# Patient Record
Sex: FEMALE | Race: WHITE | Employment: FULL TIME | URBAN - METROPOLITAN AREA
[De-identification: names, ages, dates, MRNs, and addresses within clinical notes are randomized per-mention and may not be internally consistent; named-entity substitution may affect disease eponyms.]

---

## 2017-07-17 ENCOUNTER — OFFICE VISIT (OUTPATIENT)
Dept: DERMATOLOGY | Age: 32
End: 2017-07-17

## 2017-07-17 DIAGNOSIS — Z87.2 HX OF ATOPIC DERMATITIS: ICD-10-CM

## 2017-07-17 DIAGNOSIS — L81.4 LENTIGO: ICD-10-CM

## 2017-07-17 DIAGNOSIS — D22.9 MULTIPLE NEVI: Primary | ICD-10-CM

## 2017-07-17 PROCEDURE — 99213 OFFICE O/P EST LOW 20 MIN: CPT | Performed by: DERMATOLOGY

## 2017-09-13 ENCOUNTER — TELEPHONE (OUTPATIENT)
Dept: DERMATOLOGY | Age: 32
End: 2017-09-13

## 2019-03-15 LAB
ABO, EXTERNAL RESULT: NORMAL
C. TRACHOMATIS, EXTERNAL RESULT: NEGATIVE
N. GONORRHOEAE, EXTERNAL RESULT: NEGATIVE
RH FACTOR, EXTERNAL RESULT: POSITIVE
RPR, EXTERNAL RESULT: NORMAL
RUBELLA TITER, EXTERNAL RESULT: NORMAL

## 2019-08-21 LAB — GBS, EXTERNAL RESULT: NEGATIVE

## 2019-09-13 ENCOUNTER — HOSPITAL ENCOUNTER (INPATIENT)
Age: 34
LOS: 2 days | Discharge: HOME | End: 2019-09-15
Attending: OBSTETRICS & GYNECOLOGY | Admitting: OBSTETRICS & GYNECOLOGY
Payer: COMMERCIAL

## 2019-09-13 PROBLEM — Z86.59 HISTORY OF POSTPARTUM DEPRESSION, CURRENTLY PREGNANT IN THIRD TRIMESTER: Status: ACTIVE | Noted: 2019-09-13

## 2019-09-13 PROBLEM — E06.3 HASHIMOTO'S THYROIDITIS: Status: ACTIVE | Noted: 2019-09-13

## 2019-09-13 PROBLEM — O99.891 HISTORY OF POSTPARTUM DEPRESSION, CURRENTLY PREGNANT IN THIRD TRIMESTER: Status: ACTIVE | Noted: 2019-09-13

## 2019-09-13 PROBLEM — Z34.03 SUPERVISION OF LOW-RISK FIRST PREGNANCY, THIRD TRIMESTER: Status: ACTIVE | Noted: 2019-09-13

## 2019-09-13 PROBLEM — Z34.83 ENCOUNTER FOR SUPERVISION OF OTHER NORMAL PREGNANCY, THIRD TRIMESTER: Status: ACTIVE | Noted: 2019-09-13

## 2019-09-13 LAB
AMPHETAMINE SCREEN, URINE: NORMAL
BARBITURATE SCREEN URINE: NORMAL
BENZODIAZEPINE SCREEN, URINE: NORMAL
BUPRENORPHINE URINE: NORMAL
CANNABINOID SCREEN URINE: NORMAL
COCAINE METABOLITE SCREEN URINE: NORMAL
HCT VFR BLD CALC: 34.7 % (ref 36–48)
HEMOGLOBIN: 11.6 G/DL (ref 12–16)
Lab: NORMAL
MCH RBC QN AUTO: 30.5 PG (ref 26–34)
MCHC RBC AUTO-ENTMCNC: 33.5 G/DL (ref 31–36)
MCV RBC AUTO: 91 FL (ref 80–100)
METHADONE SCREEN, URINE: NORMAL
OPIATE SCREEN URINE: NORMAL
OXYCODONE URINE: NORMAL
PDW BLD-RTO: 14.1 % (ref 12.4–15.4)
PH UA: 6
PHENCYCLIDINE SCREEN URINE: NORMAL
PLATELET # BLD: 179 K/UL (ref 135–450)
PMV BLD AUTO: 8.9 FL (ref 5–10.5)
PROPOXYPHENE SCREEN: NORMAL
RBC # BLD: 3.82 M/UL (ref 4–5.2)
SPECIMEN STATUS: NORMAL
WBC # BLD: 9.7 K/UL (ref 4–11)

## 2019-09-13 PROCEDURE — 0HQ9XZZ REPAIR PERINEUM SKIN, EXTERNAL APPROACH: ICD-10-PCS | Performed by: OBSTETRICS & GYNECOLOGY

## 2019-09-13 PROCEDURE — 6360000002 HC RX W HCPCS: Performed by: NURSE PRACTITIONER

## 2019-09-13 PROCEDURE — 7200000001 HC VAGINAL DELIVERY

## 2019-09-13 PROCEDURE — 36415 COLL VENOUS BLD VENIPUNCTURE: CPT

## 2019-09-13 PROCEDURE — 1220000000 HC SEMI PRIVATE OB R&B

## 2019-09-13 PROCEDURE — 6370000000 HC RX 637 (ALT 250 FOR IP)

## 2019-09-13 PROCEDURE — 80307 DRUG TEST PRSMV CHEM ANLYZR: CPT

## 2019-09-13 PROCEDURE — 85027 COMPLETE CBC AUTOMATED: CPT

## 2019-09-13 PROCEDURE — 86780 TREPONEMA PALLIDUM: CPT

## 2019-09-13 PROCEDURE — 10907ZC DRAINAGE OF AMNIOTIC FLUID, THERAPEUTIC FROM PRODUCTS OF CONCEPTION, VIA NATURAL OR ARTIFICIAL OPENING: ICD-10-PCS | Performed by: OBSTETRICS & GYNECOLOGY

## 2019-09-13 RX ORDER — LIDOCAINE HYDROCHLORIDE 10 MG/ML
INJECTION, SOLUTION EPIDURAL; INFILTRATION; INTRACAUDAL; PERINEURAL
Status: DISCONTINUED
Start: 2019-09-13 | End: 2019-09-13

## 2019-09-13 RX ORDER — SODIUM CHLORIDE 0.9 % (FLUSH) 0.9 %
10 SYRINGE (ML) INJECTION EVERY 12 HOURS SCHEDULED
Status: DISCONTINUED | OUTPATIENT
Start: 2019-09-13 | End: 2019-09-15 | Stop reason: HOSPADM

## 2019-09-13 RX ORDER — IBUPROFEN 800 MG/1
800 TABLET ORAL EVERY 8 HOURS
Status: DISCONTINUED | OUTPATIENT
Start: 2019-09-14 | End: 2019-09-15 | Stop reason: HOSPADM

## 2019-09-13 RX ORDER — LANOLIN 100 %
OINTMENT (GRAM) TOPICAL PRN
Status: DISCONTINUED | OUTPATIENT
Start: 2019-09-13 | End: 2019-09-15 | Stop reason: HOSPADM

## 2019-09-13 RX ORDER — SODIUM CHLORIDE 0.9 % (FLUSH) 0.9 %
10 SYRINGE (ML) INJECTION PRN
Status: DISCONTINUED | OUTPATIENT
Start: 2019-09-13 | End: 2019-09-15 | Stop reason: HOSPADM

## 2019-09-13 RX ORDER — LEVOTHYROXINE SODIUM 0.07 MG/1
75 TABLET ORAL DAILY
Status: DISCONTINUED | OUTPATIENT
Start: 2019-09-14 | End: 2019-09-15 | Stop reason: HOSPADM

## 2019-09-13 RX ORDER — SODIUM CHLORIDE, SODIUM LACTATE, POTASSIUM CHLORIDE, CALCIUM CHLORIDE 600; 310; 30; 20 MG/100ML; MG/100ML; MG/100ML; MG/100ML
INJECTION, SOLUTION INTRAVENOUS CONTINUOUS
Status: DISCONTINUED | OUTPATIENT
Start: 2019-09-13 | End: 2019-09-15 | Stop reason: HOSPADM

## 2019-09-13 RX ORDER — DOCUSATE SODIUM 100 MG/1
100 CAPSULE, LIQUID FILLED ORAL 2 TIMES DAILY
Status: DISCONTINUED | OUTPATIENT
Start: 2019-09-13 | End: 2019-09-13

## 2019-09-13 RX ORDER — ACETAMINOPHEN 325 MG/1
650 TABLET ORAL EVERY 4 HOURS PRN
Status: DISCONTINUED | OUTPATIENT
Start: 2019-09-13 | End: 2019-09-15 | Stop reason: HOSPADM

## 2019-09-13 RX ORDER — SODIUM CHLORIDE 0.9 % (FLUSH) 0.9 %
10 SYRINGE (ML) INJECTION EVERY 12 HOURS SCHEDULED
Status: DISCONTINUED | OUTPATIENT
Start: 2019-09-13 | End: 2019-09-13

## 2019-09-13 RX ORDER — SODIUM CHLORIDE, SODIUM LACTATE, POTASSIUM CHLORIDE, CALCIUM CHLORIDE 600; 310; 30; 20 MG/100ML; MG/100ML; MG/100ML; MG/100ML
INJECTION, SOLUTION INTRAVENOUS CONTINUOUS
Status: DISCONTINUED | OUTPATIENT
Start: 2019-09-13 | End: 2019-09-13

## 2019-09-13 RX ORDER — LEVOTHYROXINE SODIUM 0.07 MG/1
75 TABLET ORAL DAILY
COMMUNITY

## 2019-09-13 RX ORDER — SODIUM CHLORIDE 0.9 % (FLUSH) 0.9 %
10 SYRINGE (ML) INJECTION PRN
Status: DISCONTINUED | OUTPATIENT
Start: 2019-09-13 | End: 2019-09-13

## 2019-09-13 RX ORDER — DOCUSATE SODIUM 100 MG/1
100 CAPSULE, LIQUID FILLED ORAL 2 TIMES DAILY
Status: DISCONTINUED | OUTPATIENT
Start: 2019-09-13 | End: 2019-09-15 | Stop reason: HOSPADM

## 2019-09-13 RX ORDER — ONDANSETRON 2 MG/ML
4 INJECTION INTRAMUSCULAR; INTRAVENOUS EVERY 6 HOURS PRN
Status: DISCONTINUED | OUTPATIENT
Start: 2019-09-13 | End: 2019-09-13

## 2019-09-13 RX ADMIN — BENZOCAINE AND LEVOMENTHOL: 200; 5 SPRAY TOPICAL at 21:39

## 2019-09-13 RX ADMIN — Medication 1 MILLI-UNITS/MIN: at 19:32

## 2019-09-13 NOTE — L&D DELIVERY NOTE
Mother's Information    Labor Events     labor?:  No  Rupture type:  Artificial=AROM  Fluid color:  Clear, Bloody Show  Fluid odor:  None     Mother Delivery Information    Episiotomy:  None  Lacerations:  1st  Surgical or Additional Est. Blood Loss (mL):  0 (View Only):  Edit in Flowsheets   Combined Est. Blood Loss (mL):  0        Saran Baby Alexandro Berry [[id number]]    Labor Events     labor?:  No   steroids?:  None  Cervical ripening date/time:     Antibiotics received during labor?:  No  Rupture Identifier:  Sac 1   Rupture date/time: 19 18:44:00   Rupture type:  Artificial=AROM  Fluid color:  Clear, Bloody Show  Fluid odor:  None  Induction:  None  Augmentation:  None  Labor complications:  None          Labor Event Times    Labor onset date/time: 19   Dilation complete date/time:   19 EDT   Start pushin2019   Decision time (emergent ):        Anesthesia    Method:  None     Assisted Delivery Details    Forceps attempted?:  No  Vacuum extractor attempted?:  No     Document Additional Attempt       Document Additional Attempt             Shoulder Dystocia    Shoulder dystocia present?:  No  Add Second Maneuver  Add Third Maneuver  Add Fourth Maneuver  Add Fifth Maneuver  Add Sixth Maneuver  Add Seventh Maneuver  Add Eighth Maneuver  Add Ninth Maneuver      Presentation    Presentation:  Vertex  _:  Occiput  _:  Anterior     [place] Information    Head delivery date/time:  2019 19:07:00   Changing the 's delivery date/time could affect patient care.:     Delivery date/time:   19   Delivery type:  Vaginal, Spontaneous    Details:         Delivery Providers    Delivering clinician:  TAL Bui - TRAN   Provider Role    Sara Jiang RN Registered Nurse    Abdulaziz Palma, RN Registered Nurse    Timothy Vaughan RN OB Tech      Cord    Vessels:  3 Vessels  Complications:  None  Delayed cord clamping?:  Yes  Cord clamped date/time:  2019 190  Cord blood disposition:  Lab     Placenta    Date/time:  2019 19:21:00  Removal:  Expressed  Appearance:  Intact  Disposition:  Refrigerator     Delivery Resuscitation    Method:  Bulb Suction, Stimulation     Apgars    Living status:  Living  Apgars   1 Minute:   5 Minute:   10 Minute 15 Minute 20 Minute   Skin Color: 1  1       Heart Rate: 2  2       Reflex Irritability: 2  2       Muscle Tone: 2  2       Respiratory Effort: 1  2       Total: 8  9               Apgars Assigned By:  Veronika MENDES KTXAFW,CW\[id number]\     Skin to Skin    Skin to skin initiation date/time: 19 19:08:00   Skin to skin with: Mother, Father  Skin to skin end date/time:         Measurements    ID band #:  [id number]       Delivery Information    Episiotomy:  None  Perineal lacerations:  1st Repaired:  Yes   Vaginal laceration:  No    Cervical laceration:  No    Surgical or additional est. blood loss (mL):  0 (View Only):  Edit in Flowsheets   Combined est. blood loss (mL):  0     Vaginal Delivery Counts    Initial count personnel:  ORLY MEIER RN  Initial count verified by:  SARAH GUTIERREZ CNM   4x4:   Needles:   Instruments:   Lap Pads:   Sponges:     Initial counts:          Final counts:                Called to room by RN, patient feeling more intense pressure. AROM scant, clear fluid. SVE anterior lip. Patient feeling urge to push. Pushing well with contractions.  term male over 1st degree perineal laceration. Infant to mother's abdomen. Delayed cord clamping performed. Infant skin-to-skin with mother. Gentle tension on cord, cord avulsed. Placenta then delivered spontaneously. Vaginal sweep performed. 1st degree laceration repaired with 3-0 vicryl and good hemostasis. Infant and mother stable in recovery. Dr. Jose Willams notified of delivery.

## 2019-09-14 LAB — TOTAL SYPHILLIS IGG/IGM: NORMAL

## 2019-09-14 PROCEDURE — 1220000000 HC SEMI PRIVATE OB R&B

## 2019-09-14 PROCEDURE — 6370000000 HC RX 637 (ALT 250 FOR IP): Performed by: NURSE PRACTITIONER

## 2019-09-14 RX ADMIN — IBUPROFEN 800 MG: 800 TABLET, FILM COATED ORAL at 08:49

## 2019-09-14 RX ADMIN — LEVOTHYROXINE SODIUM 75 MCG: 75 TABLET ORAL at 08:18

## 2019-09-14 RX ADMIN — IBUPROFEN 800 MG: 800 TABLET, FILM COATED ORAL at 00:35

## 2019-09-14 RX ADMIN — IBUPROFEN 800 MG: 800 TABLET, FILM COATED ORAL at 16:57

## 2019-09-14 ASSESSMENT — PAIN SCALES - GENERAL
PAINLEVEL_OUTOF10: 3
PAINLEVEL_OUTOF10: 4
PAINLEVEL_OUTOF10: 6

## 2019-09-15 VITALS
WEIGHT: 160 LBS | SYSTOLIC BLOOD PRESSURE: 103 MMHG | RESPIRATION RATE: 16 BRPM | HEART RATE: 91 BPM | TEMPERATURE: 97.9 F | DIASTOLIC BLOOD PRESSURE: 64 MMHG | BODY MASS INDEX: 27.31 KG/M2 | HEIGHT: 64 IN

## 2019-09-15 PROBLEM — Z34.83 ENCOUNTER FOR SUPERVISION OF OTHER NORMAL PREGNANCY, THIRD TRIMESTER: Status: RESOLVED | Noted: 2019-09-13 | Resolved: 2019-09-15

## 2019-09-15 PROBLEM — Z86.59 HISTORY OF POSTPARTUM DEPRESSION, CURRENTLY PREGNANT IN THIRD TRIMESTER: Status: RESOLVED | Noted: 2019-09-13 | Resolved: 2019-09-15

## 2019-09-15 PROBLEM — E06.3 HASHIMOTO'S THYROIDITIS: Status: RESOLVED | Noted: 2019-09-13 | Resolved: 2019-09-15

## 2019-09-15 PROBLEM — Z34.03 SUPERVISION OF LOW-RISK FIRST PREGNANCY, THIRD TRIMESTER: Status: RESOLVED | Noted: 2019-09-13 | Resolved: 2019-09-15

## 2019-09-15 PROBLEM — O99.891 HISTORY OF POSTPARTUM DEPRESSION, CURRENTLY PREGNANT IN THIRD TRIMESTER: Status: RESOLVED | Noted: 2019-09-13 | Resolved: 2019-09-15

## 2019-09-15 PROCEDURE — 6370000000 HC RX 637 (ALT 250 FOR IP): Performed by: NURSE PRACTITIONER

## 2019-09-15 RX ADMIN — LEVOTHYROXINE SODIUM 75 MCG: 75 TABLET ORAL at 09:00

## 2019-09-15 RX ADMIN — IBUPROFEN 800 MG: 800 TABLET, FILM COATED ORAL at 00:41

## 2019-09-15 RX ADMIN — IBUPROFEN 800 MG: 800 TABLET, FILM COATED ORAL at 14:00

## 2019-09-15 RX ADMIN — DOCUSATE SODIUM 100 MG: 100 CAPSULE, LIQUID FILLED ORAL at 00:42

## 2019-09-15 RX ADMIN — DOCUSATE SODIUM 100 MG: 100 CAPSULE, LIQUID FILLED ORAL at 10:47

## 2019-09-15 ASSESSMENT — PAIN SCALES - GENERAL
PAINLEVEL_OUTOF10: 1
PAINLEVEL_OUTOF10: 1

## 2019-09-15 NOTE — FLOWSHEET NOTE
Patient sitting up in bed with infant in arms appears to be sleeping comfortably. Discussed plan to take infant for testing, mob states ok.

## 2020-02-10 ENCOUNTER — TELEPHONE (OUTPATIENT)
Dept: DERMATOLOGY | Age: 35
End: 2020-02-10

## 2020-02-12 NOTE — TELEPHONE ENCOUNTER
LVM for patient-she can keep her appointment in 6/2020 if she does not have any current concerns. If patient does have concerns, we can move her appointment up.

## 2021-03-18 LAB
ABO, EXTERNAL RESULT: NORMAL
HEP B, EXTERNAL RESULT: NEGATIVE
HEPATITIS C ANTIBODY, EXTERNAL RESULT: NEGATIVE
RH FACTOR, EXTERNAL RESULT: NORMAL
RPR, EXTERNAL RESULT: NON REACTIVE
RUBELLA TITER, EXTERNAL RESULT: NORMAL

## 2021-05-10 ENCOUNTER — OFFICE VISIT (OUTPATIENT)
Dept: DERMATOLOGY | Age: 36
End: 2021-05-10
Payer: COMMERCIAL

## 2021-05-10 VITALS — TEMPERATURE: 98.1 F

## 2021-05-10 DIAGNOSIS — L70.0 ACNE VULGARIS: ICD-10-CM

## 2021-05-10 DIAGNOSIS — L82.0 INFLAMED SEBORRHEIC KERATOSIS: ICD-10-CM

## 2021-05-10 DIAGNOSIS — L73.0 ACNE SCAR: ICD-10-CM

## 2021-05-10 DIAGNOSIS — D22.9 MULTIPLE NEVI: Primary | ICD-10-CM

## 2021-05-10 DIAGNOSIS — L81.4 LENTIGO: ICD-10-CM

## 2021-05-10 PROCEDURE — 99204 OFFICE O/P NEW MOD 45 MIN: CPT | Performed by: DERMATOLOGY

## 2021-05-10 PROCEDURE — 17110 DESTRUCTION B9 LES UP TO 14: CPT | Performed by: DERMATOLOGY

## 2021-05-10 RX ORDER — CLINDAMYCIN PHOSPHATE 10 UG/ML
LOTION TOPICAL
Qty: 60 ML | Refills: 4 | Status: SHIPPED | OUTPATIENT
Start: 2021-05-10 | End: 2021-05-14 | Stop reason: SDUPTHER

## 2021-05-10 NOTE — PROGRESS NOTES
[facility]  Nydia Donis MD  [address] [place] [address],[facility]  1985    37 y.o. female     Date of Visit: 5/10/2021    Chief Complaint: moles, lesions  Chief Complaint   Patient presents with    Skin Exam     tbse     Last seen: 7-2017    History of Present Illness    Running dad's 19 [employer]. *Pregnant - due Oct 2021  Has 2 children - 3, 1.6 yo    1. Here for eval of asx moles on the trunk and extremities, present for several years and no changes noted in s/s/c of any lesions; no bleeding lesions. She has a lesion on the forehead that she would like checked. Present for several years and unchanged. NO personal or family hx of skin cancer except for grandfather with NMSC. I had previously noted a dark nevus on the left lateral/posterior thigh at her last visit in 2017it appears that she had this biopsied since she was last seen by another Dorothyann Aryan does not remember it specifically being done but has a subtle scar here with focal central recurrence of pigment. It was done at the dermatology group in [facility] had seen Dr. Cris Rosales and her PA who had removed a lipoma on her left leg as well. 2. Itchy dry lesion on the back - ISK - LN2    3. C/o worsening breakouts on the back, arms/shoulders over the past few years. 4.  Would like to consider further treatment for acne scarring on the cheeks. Had laser in the past with good results and no complications. Other hx: hx of \"eczema\" on the arms and legs (pruritic erythematous patches) - typically has been worse in the winter and clears in the summers with sun exposure. Has tried topical steroids with improvement but flares within a few days with stopping use. Had flared at last visit with bruising from scratching so hard and had trouble sleeping some nights. Was treated with IM Kenalog and improved (had transient dimple in the buttocks from injection site - resolved). No recent trx needed.     Had lipoma removed - Zitelli/RENETTA OSCAR thigh    Previously addressed: acne scarring - s/p QuadraLase trx [phone] with no complications. She had erythema and crusting for several days but healed within a week. She had improvement after the trx but feels that the effects have diminished as the months have passed. She also had Fraxel (~4 trx) on the cheeks in the past and light trx (\"gisell light or soft light\") on the chin/nose with significant improvement initially but less significant improvement with subsequent trx. Review of Systems:  Gen: Feels well, good sense of health. Skin: No changing moles or lesions. Past Medical History, Family History, Surgical History, Medications and Allergies reviewed. Past Medical History:   Diagnosis Date    Post partum depression     Thyroid disease     Hashimotos       Past Surgical History:   Procedure Laterality Date    WISDOM TOOTH EXTRACTION         Outpatient Medications Marked as Taking for the 5/10/21 encounter (Office Visit) with Jorge Yao MD   Medication Sig Dispense Refill    Probiotic Product (PROBIOTIC PO) Take by mouth      levothyroxine (SYNTHROID) 75 MCG tablet Take 75 mcg by mouth Daily      Prenatal Vit-Fe Fumarate-FA (PRENATAL VITAMIN PO) Take by mouth         Allergies   Allergen Reactions    Biaxin [Clarithromycin] Nausea Only         Physical Examination     Gen, well-appearing  Full skin exam done except for underwear covered areas. trunk and extremities with scattered brown macules and papules including a 4 mm dark brown uniform well-defined oval macule on the R lower abdomen- stable in appearance  L lateral cheek with 6-7 mm tan macule    Back with pinkish-tan dull dry stuck on papule  Back, shoulders with scattered erythematous papules and macules  Cheeks with subtle textural scarring    Assessment and Plan     1. Benign-appearing nevi and lentigo - cheek  1b.  Blue nevus - FH  - educ re si/sx/ABCD's of MM   educ sun protection - OTC sunscreen with SPF 30-50+ recommended and reviewed usage  encouraged skin check yearly (sooner if indicated), self checks  - consider bx if lentigo changing/enlarging  - laser if cosmetic removal desired    2. ISK - back - 1  - lesion(s) treated with liquid nitrogen x 2 cycles. Patient educated on risk of blister, hypopigmentation/scar and wound care. 3. Acne - truncal flares - worsening  - start clinda lotion daily - bid  - BP wash; ed irritaiton and bleaching    4.  Acne scarring - s/p quadraLase severla years ago  - Consider MN, CO2 laser - 300-400 and GL for lentigo on the L cheek    *new - last seen 2017

## 2021-05-10 NOTE — PATIENT INSTRUCTIONS
Elta MD UV Clear SPF 45  Cerave Acne foaming cleanser or Panoxyl wash - for back/chest      Protecting Yourself From the Sun    · Apply an over-the-counter broad spectrum water resistant sunscreen with an SPF of at least 30 to exposed areas of the skin. Dont forget the ears and lips! Remember to reapply sunscreen about every 2 hours and after swimming or sweating. · Wear sun protective clothing. Swim shirts (aka. rash guards) are a great idea and negates the need to reapply sunscreen in those areas.      · Seek the shade whenever possible especially between the hours of 10 am and 4 pm when the suns rays are the strongest.     · Avoid tanning beds  ·

## 2021-05-13 NOTE — TELEPHONE ENCOUNTER
Dr Akila Mckeon patient  Pt c/b #[phone]  Pt stated  Was prescribed clindamycin (CLEOCIN T) 1 % lotion   But insurance won't pay for it the cost for medication is $70. Pt wanted to know was there another medication dr Akila Mckeon can prescribe.   Please c/b to discuss

## 2021-05-14 RX ORDER — CLINDAMYCIN PHOSPHATE 10 UG/ML
LOTION TOPICAL
Qty: 60 ML | Refills: 4 | Status: ON HOLD | OUTPATIENT
Start: 2021-05-14 | End: 2021-09-06

## 2021-08-24 LAB — GBS, EXTERNAL RESULT: POSITIVE

## 2021-09-06 ENCOUNTER — HOSPITAL ENCOUNTER (OUTPATIENT)
Age: 36
Discharge: HOME | End: 2021-09-06
Attending: OBSTETRICS & GYNECOLOGY | Admitting: OBSTETRICS & GYNECOLOGY
Payer: COMMERCIAL

## 2021-09-06 VITALS
HEIGHT: 64 IN | DIASTOLIC BLOOD PRESSURE: 70 MMHG | RESPIRATION RATE: 18 BRPM | TEMPERATURE: 97.3 F | SYSTOLIC BLOOD PRESSURE: 115 MMHG | BODY MASS INDEX: 28.68 KG/M2 | WEIGHT: 168 LBS | HEART RATE: 98 BPM

## 2021-09-06 LAB
A/G RATIO: 1.2 (ref 1.1–2.2)
ALBUMIN SERPL-MCNC: 3.7 G/DL (ref 3.4–5)
ALP BLD-CCNC: 170 U/L (ref 40–129)
ALT SERPL-CCNC: 6 U/L (ref 10–40)
ANION GAP SERPL CALCULATED.3IONS-SCNC: 14 MMOL/L (ref 3–16)
AST SERPL-CCNC: 11 U/L (ref 15–37)
BASOPHILS ABSOLUTE: 0 K/UL (ref 0–0.2)
BASOPHILS RELATIVE PERCENT: 0.3 %
BILIRUB SERPL-MCNC: <0.2 MG/DL (ref 0–1)
BILIRUBIN URINE: NEGATIVE
BLOOD, URINE: NEGATIVE
BUN BLDV-MCNC: 5 MG/DL (ref 7–20)
CALCIUM SERPL-MCNC: 9.3 MG/DL (ref 8.3–10.6)
CHLORIDE BLD-SCNC: 101 MMOL/L (ref 99–110)
CLARITY: CLEAR
CO2: 22 MMOL/L (ref 21–32)
COLOR: YELLOW
CREAT SERPL-MCNC: <0.5 MG/DL (ref 0.6–1.1)
EOSINOPHILS ABSOLUTE: 0 K/UL (ref 0–0.6)
EOSINOPHILS RELATIVE PERCENT: 0.4 %
GFR AFRICAN AMERICAN: >60
GFR NON-AFRICAN AMERICAN: >60
GLOBULIN: 3 G/DL
GLUCOSE BLD-MCNC: 102 MG/DL (ref 70–99)
GLUCOSE URINE: NEGATIVE MG/DL
HCT VFR BLD CALC: 30.9 % (ref 36–48)
HEMOGLOBIN: 10.4 G/DL (ref 12–16)
KETONES, URINE: NEGATIVE MG/DL
LEUKOCYTE ESTERASE, URINE: NEGATIVE
LYMPHOCYTES ABSOLUTE: 1.3 K/UL (ref 1–5.1)
LYMPHOCYTES RELATIVE PERCENT: 13.4 %
MCH RBC QN AUTO: 29.6 PG (ref 26–34)
MCHC RBC AUTO-ENTMCNC: 33.6 G/DL (ref 31–36)
MCV RBC AUTO: 87.9 FL (ref 80–100)
MICROSCOPIC EXAMINATION: NORMAL
MONOCYTES ABSOLUTE: 0.6 K/UL (ref 0–1.3)
MONOCYTES RELATIVE PERCENT: 6.2 %
NEUTROPHILS ABSOLUTE: 7.8 K/UL (ref 1.7–7.7)
NEUTROPHILS RELATIVE PERCENT: 79.7 %
NITRITE, URINE: NEGATIVE
PDW BLD-RTO: 14 % (ref 12.4–15.4)
PH UA: 6.5 (ref 5–8)
PLATELET # BLD: 199 K/UL (ref 135–450)
PMV BLD AUTO: 7.6 FL (ref 5–10.5)
POTASSIUM SERPL-SCNC: 3.8 MMOL/L (ref 3.5–5.1)
PROTEIN UA: NEGATIVE MG/DL
RBC # BLD: 3.51 M/UL (ref 4–5.2)
SODIUM BLD-SCNC: 137 MMOL/L (ref 136–145)
SPECIFIC GRAVITY UA: <1.005 (ref 1–1.03)
TOTAL PROTEIN: 6.7 G/DL (ref 6.4–8.2)
URIC ACID, SERUM: 4.6 MG/DL (ref 2.6–6)
URINE TYPE: NORMAL
UROBILINOGEN, URINE: 0.2 E.U./DL
WBC # BLD: 9.8 K/UL (ref 4–11)

## 2021-09-06 PROCEDURE — 82570 ASSAY OF URINE CREATININE: CPT

## 2021-09-06 PROCEDURE — 85025 COMPLETE CBC W/AUTO DIFF WBC: CPT

## 2021-09-06 PROCEDURE — 80053 COMPREHEN METABOLIC PANEL: CPT

## 2021-09-06 PROCEDURE — 84550 ASSAY OF BLOOD/URIC ACID: CPT

## 2021-09-06 PROCEDURE — 99211 OFF/OP EST MAY X REQ PHY/QHP: CPT

## 2021-09-06 PROCEDURE — 59025 FETAL NON-STRESS TEST: CPT

## 2021-09-06 PROCEDURE — 81003 URINALYSIS AUTO W/O SCOPE: CPT

## 2021-09-06 PROCEDURE — 84156 ASSAY OF PROTEIN URINE: CPT

## 2021-09-06 NOTE — FLOWSHEET NOTE
Pt to triage today with C/O RIGHT upper abdominal pain that began \"a few weeks ago\" but has continued to worsen. Pt also states she woke up with an \"excrusiating headache and saw spots\". Pt contacted MD and MD encouraged patient to be seen in triage. [facility] labs completed and WNL. Pt discharged ambulating 09/06/21 at 1550 and will f/u with [facility] on 9/8/21 at currently scheduled appt. Pt denies additional questions or concerns.

## 2021-09-07 LAB
CREATININE URINE: 21.6 MG/DL (ref 28–259)
PROTEIN PROTEIN: <4 MG/DL
PROTEIN/CREAT RATIO: ABNORMAL MG/DL

## 2021-10-18 ENCOUNTER — HOSPITAL ENCOUNTER (INPATIENT)
Age: 36
LOS: 2 days | Discharge: HOME | End: 2021-10-20
Attending: OBSTETRICS & GYNECOLOGY | Admitting: OBSTETRICS & GYNECOLOGY
Payer: COMMERCIAL

## 2021-10-18 PROBLEM — Z34.90 ENCOUNTER FOR PLANNED INDUCTION OF LABOR: Status: ACTIVE | Noted: 2021-10-18

## 2021-10-18 LAB
ABO/RH: NORMAL
AMPHETAMINE SCREEN, URINE: NORMAL
ANTIBODY SCREEN: NORMAL
BARBITURATE SCREEN URINE: NORMAL
BASOPHILS ABSOLUTE: 0 K/UL (ref 0–0.2)
BASOPHILS RELATIVE PERCENT: 0.4 %
BENZODIAZEPINE SCREEN, URINE: NORMAL
BUPRENORPHINE URINE: NORMAL
CANNABINOID SCREEN URINE: NORMAL
COCAINE METABOLITE SCREEN URINE: NORMAL
EOSINOPHILS ABSOLUTE: 0 K/UL (ref 0–0.6)
EOSINOPHILS RELATIVE PERCENT: 0.4 %
HCT VFR BLD CALC: 31.6 % (ref 36–48)
HEMOGLOBIN: 10.8 G/DL (ref 12–16)
LYMPHOCYTES ABSOLUTE: 1.4 K/UL (ref 1–5.1)
LYMPHOCYTES RELATIVE PERCENT: 16.2 %
Lab: NORMAL
MCH RBC QN AUTO: 29.2 PG (ref 26–34)
MCHC RBC AUTO-ENTMCNC: 34.3 G/DL (ref 31–36)
MCV RBC AUTO: 85 FL (ref 80–100)
METHADONE SCREEN, URINE: NORMAL
MONOCYTES ABSOLUTE: 0.5 K/UL (ref 0–1.3)
MONOCYTES RELATIVE PERCENT: 5.4 %
NEUTROPHILS ABSOLUTE: 6.7 K/UL (ref 1.7–7.7)
NEUTROPHILS RELATIVE PERCENT: 77.6 %
OPIATE SCREEN URINE: NORMAL
OXYCODONE URINE: NORMAL
PDW BLD-RTO: 16.5 % (ref 12.4–15.4)
PH UA: 6
PHENCYCLIDINE SCREEN URINE: NORMAL
PLATELET # BLD: 195 K/UL (ref 135–450)
PMV BLD AUTO: 8.2 FL (ref 5–10.5)
PROPOXYPHENE SCREEN: NORMAL
RBC # BLD: 3.71 M/UL (ref 4–5.2)
SARS-COV-2, NAAT: NOT DETECTED
TOTAL SYPHILLIS IGG/IGM: NORMAL
WBC # BLD: 8.7 K/UL (ref 4–11)

## 2021-10-18 PROCEDURE — 7200000001 HC VAGINAL DELIVERY

## 2021-10-18 PROCEDURE — 86901 BLOOD TYPING SEROLOGIC RH(D): CPT

## 2021-10-18 PROCEDURE — 2500000003 HC RX 250 WO HCPCS

## 2021-10-18 PROCEDURE — 86900 BLOOD TYPING SEROLOGIC ABO: CPT

## 2021-10-18 PROCEDURE — 86850 RBC ANTIBODY SCREEN: CPT

## 2021-10-18 PROCEDURE — 2580000003 HC RX 258: Performed by: OBSTETRICS & GYNECOLOGY

## 2021-10-18 PROCEDURE — 3E033VJ INTRODUCTION OF OTHER HORMONE INTO PERIPHERAL VEIN, PERCUTANEOUS APPROACH: ICD-10-PCS | Performed by: OBSTETRICS & GYNECOLOGY

## 2021-10-18 PROCEDURE — 6360000002 HC RX W HCPCS: Performed by: OBSTETRICS & GYNECOLOGY

## 2021-10-18 PROCEDURE — 87635 SARS-COV-2 COVID-19 AMP PRB: CPT

## 2021-10-18 PROCEDURE — 1200000000 HC SEMI PRIVATE

## 2021-10-18 PROCEDURE — 86780 TREPONEMA PALLIDUM: CPT

## 2021-10-18 PROCEDURE — 85025 COMPLETE CBC W/AUTO DIFF WBC: CPT

## 2021-10-18 PROCEDURE — 80307 DRUG TEST PRSMV CHEM ANLYZR: CPT

## 2021-10-18 PROCEDURE — 10907ZC DRAINAGE OF AMNIOTIC FLUID, THERAPEUTIC FROM PRODUCTS OF CONCEPTION, VIA NATURAL OR ARTIFICIAL OPENING: ICD-10-PCS | Performed by: OBSTETRICS & GYNECOLOGY

## 2021-10-18 PROCEDURE — 0KQM0ZZ REPAIR PERINEUM MUSCLE, OPEN APPROACH: ICD-10-PCS | Performed by: OBSTETRICS & GYNECOLOGY

## 2021-10-18 PROCEDURE — 6370000000 HC RX 637 (ALT 250 FOR IP): Performed by: OBSTETRICS & GYNECOLOGY

## 2021-10-18 RX ORDER — SODIUM CHLORIDE, SODIUM LACTATE, POTASSIUM CHLORIDE, CALCIUM CHLORIDE 600; 310; 30; 20 MG/100ML; MG/100ML; MG/100ML; MG/100ML
INJECTION, SOLUTION INTRAVENOUS CONTINUOUS
Status: DISCONTINUED | OUTPATIENT
Start: 2021-10-18 | End: 2021-10-18

## 2021-10-18 RX ORDER — MODIFIED LANOLIN
OINTMENT (GRAM) TOPICAL PRN
Status: DISCONTINUED | OUTPATIENT
Start: 2021-10-18 | End: 2021-10-20 | Stop reason: HOSPADM

## 2021-10-18 RX ORDER — CEPHALEXIN 500 MG/1
500 CAPSULE ORAL 4 TIMES DAILY
COMMUNITY
End: 2022-06-27

## 2021-10-18 RX ORDER — SODIUM CHLORIDE, SODIUM LACTATE, POTASSIUM CHLORIDE, CALCIUM CHLORIDE 600; 310; 30; 20 MG/100ML; MG/100ML; MG/100ML; MG/100ML
INJECTION, SOLUTION INTRAVENOUS CONTINUOUS
Status: DISCONTINUED | OUTPATIENT
Start: 2021-10-18 | End: 2021-10-20 | Stop reason: HOSPADM

## 2021-10-18 RX ORDER — DOCUSATE SODIUM 100 MG/1
100 CAPSULE, LIQUID FILLED ORAL 2 TIMES DAILY
Status: DISCONTINUED | OUTPATIENT
Start: 2021-10-18 | End: 2021-10-20 | Stop reason: HOSPADM

## 2021-10-18 RX ORDER — SODIUM CHLORIDE 9 MG/ML
25 INJECTION, SOLUTION INTRAVENOUS PRN
Status: DISCONTINUED | OUTPATIENT
Start: 2021-10-18 | End: 2021-10-18

## 2021-10-18 RX ORDER — SODIUM CHLORIDE, SODIUM LACTATE, POTASSIUM CHLORIDE, AND CALCIUM CHLORIDE .6; .31; .03; .02 G/100ML; G/100ML; G/100ML; G/100ML
1000 INJECTION, SOLUTION INTRAVENOUS PRN
Status: DISCONTINUED | OUTPATIENT
Start: 2021-10-18 | End: 2021-10-18

## 2021-10-18 RX ORDER — SODIUM CHLORIDE 0.9 % (FLUSH) 0.9 %
5-40 SYRINGE (ML) INJECTION PRN
Status: DISCONTINUED | OUTPATIENT
Start: 2021-10-18 | End: 2021-10-18

## 2021-10-18 RX ORDER — SODIUM CHLORIDE 0.9 % (FLUSH) 0.9 %
5-40 SYRINGE (ML) INJECTION EVERY 12 HOURS SCHEDULED
Status: DISCONTINUED | OUTPATIENT
Start: 2021-10-18 | End: 2021-10-18

## 2021-10-18 RX ORDER — IBUPROFEN 600 MG/1
600 TABLET ORAL EVERY 8 HOURS PRN
Status: DISCONTINUED | OUTPATIENT
Start: 2021-10-18 | End: 2021-10-20 | Stop reason: HOSPADM

## 2021-10-18 RX ORDER — DOCUSATE SODIUM 100 MG/1
100 CAPSULE, LIQUID FILLED ORAL 2 TIMES DAILY
Status: DISCONTINUED | OUTPATIENT
Start: 2021-10-18 | End: 2021-10-18

## 2021-10-18 RX ORDER — ONDANSETRON 2 MG/ML
4 INJECTION INTRAMUSCULAR; INTRAVENOUS EVERY 6 HOURS PRN
Status: DISCONTINUED | OUTPATIENT
Start: 2021-10-18 | End: 2021-10-18

## 2021-10-18 RX ORDER — SODIUM CHLORIDE, SODIUM LACTATE, POTASSIUM CHLORIDE, AND CALCIUM CHLORIDE .6; .31; .03; .02 G/100ML; G/100ML; G/100ML; G/100ML
500 INJECTION, SOLUTION INTRAVENOUS PRN
Status: DISCONTINUED | OUTPATIENT
Start: 2021-10-18 | End: 2021-10-18

## 2021-10-18 RX ORDER — SODIUM CHLORIDE 0.9 % (FLUSH) 0.9 %
10 SYRINGE (ML) INJECTION PRN
Status: DISCONTINUED | OUTPATIENT
Start: 2021-10-18 | End: 2021-10-20 | Stop reason: HOSPADM

## 2021-10-18 RX ORDER — ACETAMINOPHEN 325 MG/1
650 TABLET ORAL EVERY 4 HOURS PRN
Status: DISCONTINUED | OUTPATIENT
Start: 2021-10-18 | End: 2021-10-20 | Stop reason: HOSPADM

## 2021-10-18 RX ORDER — SODIUM CHLORIDE 0.9 % (FLUSH) 0.9 %
10 SYRINGE (ML) INJECTION EVERY 12 HOURS SCHEDULED
Status: DISCONTINUED | OUTPATIENT
Start: 2021-10-18 | End: 2021-10-20 | Stop reason: HOSPADM

## 2021-10-18 RX ORDER — LIDOCAINE HYDROCHLORIDE 10 MG/ML
INJECTION, SOLUTION EPIDURAL; INFILTRATION; INTRACAUDAL; PERINEURAL
Status: COMPLETED
Start: 2021-10-18 | End: 2021-10-18

## 2021-10-18 RX ORDER — SODIUM CHLORIDE 9 MG/ML
25 INJECTION, SOLUTION INTRAVENOUS PRN
Status: DISCONTINUED | OUTPATIENT
Start: 2021-10-18 | End: 2021-10-20 | Stop reason: HOSPADM

## 2021-10-18 RX ADMIN — Medication 166.7 ML: at 20:17

## 2021-10-18 RX ADMIN — PENICILLIN G POTASSIUM 5 MILLION UNITS: 5000000 INJECTION, POWDER, FOR SOLUTION INTRAMUSCULAR; INTRAVENOUS at 11:30

## 2021-10-18 RX ADMIN — Medication 2.5 MILLION UNITS: at 15:22

## 2021-10-18 RX ADMIN — IBUPROFEN 600 MG: 600 TABLET ORAL at 22:13

## 2021-10-18 RX ADMIN — Medication 2.5 MILLION UNITS: at 18:58

## 2021-10-18 RX ADMIN — Medication 1 MILLI-UNITS/MIN: at 11:32

## 2021-10-18 RX ADMIN — SODIUM CHLORIDE, POTASSIUM CHLORIDE, SODIUM LACTATE AND CALCIUM CHLORIDE 1000 ML: 600; 310; 30; 20 INJECTION, SOLUTION INTRAVENOUS at 15:23

## 2021-10-18 RX ADMIN — Medication 87.3 MILLI-UNITS/MIN: at 20:16

## 2021-10-18 RX ADMIN — LIDOCAINE HYDROCHLORIDE: 10 INJECTION, SOLUTION EPIDURAL; INFILTRATION; INTRACAUDAL; PERINEURAL at 20:30

## 2021-10-18 NOTE — H&P
Obstetrics and Gynecology   Obstetrics History and Physical        CHIEF COMPLAINT:  Scheduled induction of labor at term    HISTORY OF PRESENT ILLNESS:      The patient is a 37 y.o. female at 39w6d. OB History        3    Para   2    Term   2            AB        Living   2       SAB        TAB        Ectopic        Molar        Multiple   0    Live Births   2            Patient presents with a chief complaint as above and is being admitted for induction. The patient does express a modest desire for natural childbirth as she has delivered her other babies un-medicated and she is consented for this choice at each office visit. She recognized that this labor may be different with this as an induction which is different than her other two. She is open to the choice of the epidural option. Estimated Due Date: Estimated Date of Delivery: 10/13/21    PRENATAL CARE:    Complicated by: mild polyhydramnios   Patient Active Problem List:     Encounter for planned induction of labor        PAST OB HISTORY:  OB History        3    Para   2    Term   2            AB        Living   2       SAB        TAB        Ectopic        Molar        Multiple   0    Live Births   2                Past Medical History:        Diagnosis Date    Anemia     iron     Post partum depression     Thyroid disease     Hashimotos     Past Surgical History:        Procedure Laterality Date    WISDOM TOOTH EXTRACTION       Allergies:  Biaxin [clarithromycin]    Social History:    Social History     Socioeconomic History    Marital status:      Spouse name: Not on file    Number of children: Not on file    Years of education: Not on file    Highest education level: Not on file   Occupational History    Not on file   Tobacco Use    Smoking status: Never Smoker    Smokeless tobacco: Never Used   Vaping Use    Vaping Use: Never used   Substance and Sexual Activity    Alcohol use: No    Drug use:  No  Sexual activity: Yes     Partners: Male   Other Topics Concern    Not on file   Social History Narrative    Not on file     Social Determinants of Health     Financial Resource Strain:     Difficulty of Paying Living Expenses:    Food Insecurity:     Worried About Running Out of Food in the Last Year:     [address] in the Last Year:    Transportation Needs:     Lack of Transportation (Medical):      Lack of Transportation (Non-Medical):    Physical Activity:     Days of Exercise per Week:     Minutes of Exercise per Session:    Stress:     Feeling of Stress :    Social Connections:     Frequency of Communication with Friends and Family:     Frequency of Social Gatherings with Friends and Family:     Attends Mu-ism Services:     Active Member of Clubs or Organizations:     Attends Club or Organization Meetings:     Marital Status:    Intimate Partner Violence:     Fear of Current or Ex-Partner:     Emotionally Abused:     Physically Abused:     Sexually Abused:      Family History:       Problem Relation Age of Onset    Cancer Maternal Grandfather      Medications Prior to Admission:  Medications Prior to Admission: cephALEXin (KEFLEX) 500 MG capsule, Take 500 mg by mouth 4 times daily  Probiotic Product (PROBIOTIC PO), Take by mouth  levothyroxine (SYNTHROID) 75 MCG tablet, Take 75 mcg by mouth Daily  Prenatal Vit-Fe Fumarate-FA (PRENATAL VITAMIN PO), Take by mouth    REVIEW OF SYSTEMS:  Denies any of the following:  fever/chills, headache, visual changes, chest pain, shortness of breath, nausea/vomiting/diarrhea, bruising and rash    PHYSICAL EXAM:  Vitals:    10/18/21 1327 10/18/21 1437 10/18/21 1619 10/18/21 1725   BP: 111/61 135/80 123/80 125/77   Pulse: 75 85 93 108   Resp:  18 18 18   Temp:  98.6 °F (37 °C)     TempSrc:  Oral     Weight:       Height:         General appearance:  awake, alert, cooperative, no apparent distress, and appears stated age  Neurologic:  Awake, alert, oriented to name, place and time. Lungs:  No increased work of breathing, good air exchange  Abdomen:  Soft, non tender, gravid, consistent with her gestational age,   EFW:  by external exam was  appropriate for gestational age  Fetal heart rate:  Reassuring. FETAL SURVEILLANCE TESTING SUMMARY  INDICATIONS:  Early labor evaluation. OBJECTIVE RESULTS:  Fetal heart variability: moderate  Fetal Heart Rate decelerations: none  Fetal Heart Rate accelerations: yes  Baseline FHR: 135 per minute  Uterine irritability: yes  Uterine contractions: none at time of admission and tolerating q3-5 minutes apart now  Fetal surveillance: reassuring, Category 1  Pelvis:  Adequate pelvis  Fetal position: Cephalic    CERVIX: At admission in triage:    Dilation:  3 cm  Effacement:   60%  Station:  -2 cm  Consistency:  medium  Position:  mid    Dilation (cm): 5   Effacement: 70   Cervical Characteristics: Posterior   Station: -1   Presentation: Vertex    Membranes:  Ruptured clear fluid    Labs:   CBC:   Lab Results   Component Value Date    WBC 8.7 10/18/2021    RBC 3.71 10/18/2021    HGB 10.8 10/18/2021    HCT 31.6 10/18/2021    MCV 85.0 10/18/2021    MCH 29.2 10/18/2021    MCHC 34.3 10/18/2021    RDW 16.5 10/18/2021     10/18/2021    MPV 8.2 10/18/2021       ASSESSMENT AND PLAN    Labor: Admit, anticipate normal delivery, routine labor orders  Fetus: Reassuring  GBS: Yes, receiving appropriate prophylaxis at this time, two doses are already in. Other: Supportive care for this patient at term with desire for natural childbirth and with mom and baby doing well at this time.

## 2021-10-18 NOTE — PROGRESS NOTES
Pt requesting wireless fetal monitoring. RN remained at bedside to attempt wireless monitoring from [id number]. External fetal monitoring and TOCO placed back on due to [address] [place] [address] monitor not working.

## 2021-10-19 PROBLEM — Z34.90 SUPERVISION OF REPEAT TERM PREGNANCY: Status: ACTIVE | Noted: 2021-10-19

## 2021-10-19 PROBLEM — Z34.90 SUPERVISION OF REPEAT TERM PREGNANCY: Status: RESOLVED | Noted: 2021-10-19 | Resolved: 2021-10-19

## 2021-10-19 PROBLEM — Z34.90 ENCOUNTER FOR PLANNED INDUCTION OF LABOR: Status: RESOLVED | Noted: 2021-10-18 | Resolved: 2021-10-19

## 2021-10-19 PROCEDURE — 1200000000 HC SEMI PRIVATE

## 2021-10-19 PROCEDURE — 6370000000 HC RX 637 (ALT 250 FOR IP): Performed by: OBSTETRICS & GYNECOLOGY

## 2021-10-19 RX ADMIN — BENZOCAINE AND LEVOMENTHOL: 200; 5 SPRAY TOPICAL at 06:00

## 2021-10-19 RX ADMIN — IBUPROFEN 600 MG: 600 TABLET ORAL at 20:53

## 2021-10-19 RX ADMIN — DOCUSATE SODIUM 100 MG: 100 CAPSULE ORAL at 10:22

## 2021-10-19 RX ADMIN — IBUPROFEN 600 MG: 600 TABLET ORAL at 06:00

## 2021-10-19 RX ADMIN — DOCUSATE SODIUM 100 MG: 100 CAPSULE ORAL at 20:53

## 2021-10-19 ASSESSMENT — PAIN SCALES - GENERAL
PAINLEVEL_OUTOF10: 3
PAINLEVEL_OUTOF10: 3

## 2021-10-19 NOTE — PROGRESS NOTES
Pt instructed on perineal care and self administration of perineal meds. Pt verbalized understanding and may self medicate. Pt instructed to keep medications out of the reach of children. Patient transferred to postpartum by wheelchair and settled into postpartum room. Pt oriented to folder and postpartum care. Oriented to call light, phone and ordering meals. Postpartum RN's name and phone number posted for pt. Siderails up x2. Pt oriented to equipment. Report given. Pt included in discussion and all questions answered.

## 2021-10-19 NOTE — L&D DELIVERY NOTE
Mother's Information    Labor Events     labor?: No  Rupture type: Artificial=AROM, Intact  Fluid color: Clear  Fluid odor: None     Mother Delivery Information    Episiotomy: None  Lacerations: 2nd  Surgical or Additional Est. Blood Loss (mL): 0 (View Only): Edit in Flowsheets   Combined Est. Blood Loss (mL): 0        Betina, Baby Girl Kylah Rousseau [[id number]]    Labor Events     labor?: No   steroids?: None  Cervical ripening date/time:     Antibiotics received during labor?: Yes  Rupture date/time: 10/18/21 18:09:00   Rupture type: Artificial=AROM  Fluid color: Clear  Fluid odor: None  Induction: Oxytocin  Indications for induction: Post-term Gestation  Augmentation: AROM  Indications for augmentation: Ineffective Contraction Pattern  Labor complications: None          Labor Event Times    Labor onset date/time: 10/18/21 1810   Dilation complete date/time:      Start pushing:    Decision time (emergent ): 10/18/2021 2006      Anesthesia    Method: Local     Assisted Delivery Details    Forceps attempted?: No  Vacuum extractor attempted?: No     Document Additional Attempt       Document Additional Attempt             Shoulder Dystocia    Shoulder dystocia present?: No  Add Second Maneuver  Add Third Maneuver  Add Fourth Maneuver  Add Fifth Maneuver  Add Sixth Maneuver  Add Seventh Maneuver  Add Eighth Maneuver  Add Ninth Maneuver     Sixes Presentation    Presentation: Vertex  Position: Left  _: Occiput     [place] Information    Head delivery date/time: 10/18/2021 20:07:00   Changing the 's delivery date/time could affect patient care.:    Delivery date/time:  10/18/21 2007   Delivery type: Vaginal, Spontaneous    Details:        Delivery Providers    Delivering clinician: Krysten Kim MD   Provider Role    Dipesh Hooker RN Delivery Nurse    Dorene Solis RN Charge Nurse    Roberto Barahona MA OB Tech      Cord    Vessels: 3 Vessels  Complications: None  Delayed cord clamping?: Yes  Cord clamped date/time: 10/18/2021 2010  Cord blood disposition: Lab  Gases sent?: No  Stem cell collection (by provider): No     Placenta    Date/time: 10/18/2021 20:16:00  Removal: Spontaneous  Appearance: Intact  Disposition: Refrigerator     Delivery Resuscitation    Method: Bulb Suction, Stimulation     Apgars    Living status: Living  Apgars   1 Minute:  5 Minute:  10 Minute 15 Minute 20 Minute   Skin Color: 1  1       Heart Rate: 2  2       Reflex Irritability: 2  2       Muscle Tone: 2  2       Respiratory Effort: 2  2       Total: 9  9               Apgars Assigned By: Livia [facility] RN     Skin to Skin    Skin to skin initiation date/time: 10/18/21 20:07:00 EDT   Skin to skin with: Mother  Skin to skin end date/time: 10/18/21 20:20:00       Measurements    Weight: 3915 g Length: 52 cm   Head circumference: 35 cm    Birth comments: adia 557 /      Delivery Information    Episiotomy: None  Perineal lacerations: 2nd Repaired: Yes   Vaginal laceration: No    Cervical laceration: No    Surgical or additional est. blood loss (mL): 0 (View Only): Edit in Flowsheets   Combined est. blood loss (mL): 0     Vaginal Delivery Counts    Initial count personnel: DR. Annette Garcia  Initial count verified by: [facility] RN   4x4:  [place]k:  Instruments:  Lap Pads:  Sponges:    Initial counts:         Final counts:               Spontaneous vaginal delivery over second degree laceration repaired under local anesthesia in the usual fashion. Mom and baby are doing well at this time.

## 2021-10-19 NOTE — DISCHARGE SUMMARY
Obstetrical Discharge Form    Gestational Age: 39w6d    Antepartum complications: none    Date of Delivery:    Information for the patient's :  Kory Lama [[id number]]   @[facility]@      Information for the patient's :  Josey Aguilar [[id number]]   @[facility]       Type of Delivery: vaginal, spontaneous    Delivered By: Ava Seymour MD    Baby:      Information for the patient's :  Kory Lama [[id number]]   APGAR One: 9     Information for the patient's :  Kory Lama [[id number]]   APGAR Five: 9     Information for the patient's :  Kory Lama [[id number]]   Birth Weight: 8 lb 10.1 oz (3.915 kg)       Anesthesia: None    Intrapartum complications: None    Postpartum complications: none    Discharge Medication:    Hayley Fernando   Home Medication Instructions [id number]    Printed on:10/19/21 1341   Medication Information                      cephALEXin (KEFLEX) 500 MG capsule  Take 500 mg by mouth 4 times daily             levothyroxine (SYNTHROID) 75 MCG tablet  Take 75 mcg by mouth Daily             Prenatal Vit-Fe Fumarate-FA (PRENATAL VITAMIN PO)  Take by mouth             Probiotic Product (PROBIOTIC PO)  Take by mouth                  Discharge Condition:  good    Discharge Date: 10/19/2021    PLAN:  Follow up in 6 weeks for routine PP visit  All questions answered  D/C summary is written for D/C planning purposes, any delay in discharge from ordered D/C date due may be due to  factors and may be dependant on pediatric orders for  discharge planning.     Ava Seymour MD

## 2021-10-20 VITALS
HEART RATE: 84 BPM | BODY MASS INDEX: 29.88 KG/M2 | TEMPERATURE: 98.4 F | SYSTOLIC BLOOD PRESSURE: 122 MMHG | DIASTOLIC BLOOD PRESSURE: 74 MMHG | HEIGHT: 64 IN | RESPIRATION RATE: 18 BRPM | WEIGHT: 175 LBS

## 2021-10-20 NOTE — PROGRESS NOTES
Parents are ready to be discharged asap. Fob is at the desk asking how soon they will be able to leave. RN can hear infant screaming and crying . Parents informed that in order to complete testing baby needs to be calm and preferably be sleeping. . Will continue to monitor.

## 2021-10-20 NOTE — PROGRESS NOTES
Infant remains spitty and emesis color is currently fluorescent green. Abdomen is slightly distended but unchanged since beginning of shift. Bowel sounds are present x 4 quadrants. Infant had a recent small meconium diaper. Dr Jackie Syed called to report this RN's concerns. Infant to have an abdominal xray now. Dr Jackie Syed is coming in to evaluate infant. Baby taken to [facility]. Parents informed. Will continue to monitor.

## 2021-10-20 NOTE — PROGRESS NOTES
Mob is very anxious regarding infant being spitty and choking. She is questioning whether or not she should stay overnight and cancel her discharge. Parents were educated and given the opportunity to ask questions. Plan of care was reviewed to reduce anxiety. Parents feel more comfortable with support staff/nurse being  available throughout the night if needed .  Dr Gage Curry notified and says it is okay to cancel discharge and she can leave in the am.

## 2022-04-06 ENCOUNTER — TELEPHONE (OUTPATIENT)
Dept: DERMATOLOGY | Age: 37
End: 2022-04-06

## 2022-04-06 NOTE — TELEPHONE ENCOUNTER
Dr Diandra Mesa patient  Pt c/b #[phone]  Pt stated  Has a cyst on her forehead that is getting larger and painful and squishy feeling. Pt says she's had it for years it is something dr Diandra Mesa has seen before. Patient says she noticed the changes a couple weeks ago. Pt is scheduled for her annual visit in June but was due to come back in May.  Patient wanted to know could she get in sooner to have cyst checked and overall skin exam.

## 2022-04-06 NOTE — TELEPHONE ENCOUNTER
Patient scheduled to have what she describes as a cyst on her scalp. Patient's skin exam scheduled in June-patient to keep that appointment.

## 2022-05-26 ENCOUNTER — TELEPHONE (OUTPATIENT)
Dept: DERMATOLOGY | Age: 37
End: 2022-05-26

## 2022-05-26 NOTE — TELEPHONE ENCOUNTER
Patient is requesting a return call to reschedule 5/26 appt missed due to running late at previous appt. Please advise. Thank you!

## 2022-06-27 ENCOUNTER — OFFICE VISIT (OUTPATIENT)
Dept: DERMATOLOGY | Age: 37
End: 2022-06-27
Payer: COMMERCIAL

## 2022-06-27 DIAGNOSIS — D22.9 MULTIPLE NEVI: Primary | ICD-10-CM

## 2022-06-27 DIAGNOSIS — L73.0 ACNE SCAR: ICD-10-CM

## 2022-06-27 DIAGNOSIS — L82.0 INFLAMED SEBORRHEIC KERATOSIS: ICD-10-CM

## 2022-06-27 DIAGNOSIS — L81.4 LENTIGO: ICD-10-CM

## 2022-06-27 DIAGNOSIS — L70.0 ACNE VULGARIS: ICD-10-CM

## 2022-06-27 DIAGNOSIS — D48.5 NEOPLASM OF UNCERTAIN BEHAVIOR OF SKIN: ICD-10-CM

## 2022-06-27 PROCEDURE — 17110 DESTRUCTION B9 LES UP TO 14: CPT | Performed by: DERMATOLOGY

## 2022-06-27 PROCEDURE — 11102 TANGNTL BX SKIN SINGLE LES: CPT | Performed by: DERMATOLOGY

## 2022-06-27 PROCEDURE — 99213 OFFICE O/P EST LOW 20 MIN: CPT | Performed by: DERMATOLOGY

## 2022-06-27 RX ORDER — ERGOCALCIFEROL 1.25 MG/1
CAPSULE ORAL
COMMUNITY
Start: 2022-04-20

## 2022-06-27 RX ORDER — LEVOTHYROXINE SODIUM 0.07 MG/1
75 TABLET ORAL DAILY
COMMUNITY
End: 2022-06-27

## 2022-06-27 NOTE — PATIENT INSTRUCTIONS
Biopsy Wound Care Instructions    · Keep the bandage in place for 24 hours. · Cleanse the wound with mild soapy water daily   Gently dry the area.  Apply Vaseline or petroleum jelly to the wound using a cotton tipped applicator.  Cover with a clean bandage.  Repeat this process until the biopsy site is healed.  If you had stitches placed, continue treating the site until the stitches are removed. Remember to make an appointment to return to have your stitches removed by our staff.  You may shower and bathe as usual.       ** Biopsy results generally take around 7 business days to come back. If you have not heard from us by then, please call the office at [phone]. *Please note that biopsy results are released to both the patient and physician at the same time in [address] Please allow time for your physician to review the results. One of our staff members will reach out to you with the results and plan. Protecting Yourself From the Sun    · Apply an over-the-counter broad spectrum water resistant sunscreen with an SPF of at least 30 to exposed areas of the skin. Dont forget the ears and lips! Remember to reapply sunscreen about every 2 hours and after swimming or sweating. · Wear sun protective clothing. Swim shirts (aka. rash guards) are a great idea and negates the need to reapply sunscreen in those areas.      · Seek the shade whenever possible especially between the hours of 10 am and 4 pm when the suns rays are the strongest.     · Avoid tanning beds  ·

## 2022-06-27 NOTE — PROGRESS NOTES
[address] [facility]  Ana Dougherty MD  [address] [place] [address],[facility]  1985    31 y.o. female     Date of Visit: 6/27/2022    Chief Complaint: moles, lesions  Chief Complaint   Patient presents with    Skin Exam     Last seen: 5-2021    History of Present Illness    Running family [address]. Has 2 children - 4.5, 2.4 yo and 8 months     1. Here for eval of asx moles on the trunk and extremities, present for several years and no changes noted in s/s/c of any lesions; no bleeding lesions. She has a lesion on the forehead that she would like checked. Present for many years and unchanged - blue nevus. NO personal or family hx of skin cancer except for grandfather with NMSC. I had previously noted a dark nevus on the left lateral/posterior thigh at her visit in 2017it appears that she had this biopsied since she was last seen by another Tiffanie Byrnes does not remember it specifically being done but has a subtle scar here with focal central recurrence of pigment. It was done at the dermatology group in [facility] had seen Dr. Kwaku Nogueira and her PA who had removed a lipoma on her left leg as well. 2. Itchy dry lesion on the popliteal area. 3. F/u acne (back/torso) - not bad recently. Previously discussed treatment for acne scarring on the cheeks. Had laser in the past with good results and no complications. 4. She has a changing brown patch on the L cheek. Asx. Other hx: hx of \"eczema\" on the arms and legs (pruritic erythematous patches) - typically has been worse in the winter and clears in the summers with sun exposure. Has tried topical steroids with improvement but flares within a few days with stopping use. Had flared at last visit with bruising from scratching so hard and had trouble sleeping some nights. Was treated with IM Kenalog and improved (had transient dimple in the buttocks from injection site - resolved). No recent trx needed.     Had lipoma removed - Zitelli/PA - L thigh    Previously addressed: acne scarring - s/p QuadraLase trx [id number] with no complications. She had erythema and crusting for several days but healed within a week. She had improvement after the trx but feels that the effects have diminished as the months have passed. She also had Fraxel (~4 trx) on the cheeks in the past and light trx (\"gisell light or soft light\") on the chin/nose with significant improvement initially but less significant improvement with subsequent trx. Review of Systems:  Gen: Feels well, good sense of health. Skin: No changing moles or lesions. Past Medical History, Family History, Surgical History, Medications and Allergies reviewed. Past Medical History:   Diagnosis Date    Anemia     iron     Post partum depression     Thyroid disease     Hashimotos       Past Surgical History:   Procedure Laterality Date    WISDOM TOOTH EXTRACTION         Outpatient Medications Marked as Taking for the 6/27/22 encounter (Office Visit) with Eduardo Monzon MD   Medication Sig Dispense Refill    vitamin D (ERGOCALCIFEROL) 1.25 MG (27828 UT) CAPS capsule TAKE 1 CAPSULE BY MOUTH 1 TIME A WEEK      Probiotic Product (PROBIOTIC PO) Take by mouth      levothyroxine (SYNTHROID) 75 MCG tablet Take 75 mcg by mouth Daily      Prenatal Vit-Fe Fumarate-FA (PRENATAL VITAMIN PO) Take by mouth         Allergies   Allergen Reactions    Biaxin [Clarithromycin] Nausea Only         Physical Examination     Gen, well-appearing  Full skin exam done except for underwear covered areas.     trunk and extremities with scattered brown macules and papules including a 4 mm dark brown uniform well-defined oval macule on the R lower abdomen- stable in appearance - photo taken 2022  L lateral cheek with 10-11 mm tan macule - bigger              R popliteal with stuck-on dull-surfaced brown papule   Cheeks with subtle textural scarring  FH with 1 mm blue macule    L nipple inverted - reports always been this way    Assessment and Plan     1. Benign-appearing nevi and lentigines  1b. Blue nevus - FH  - Monitor for ABCD's of MM and si/sx of NMSC  Continue sun protection - OTC sunscreen with SPF 30-50+ recommended and reviewed usage  Encouraged skin check yearly (sooner if indicated), self checks  - laser if cosmetic removal desired    2. ISK - R popliteal - 1  - lesion(s) treated with liquid nitrogen x 2 cycles. Patient educated on risk of blister, hypopigmentation/scar and wound care. 3. Acne - clear/stable at this point  3b. Acne scarring - s/p quadraLase severla years ago  - Consider MN, CO2 laser - 300-400    4. L lateral cheek/zygoma - r/o lentigo vs LM  - Shave biopsy performed after verbal consent obtained. Patient educated regarding risk of bleeding, infection, scar and educated on wound care. Skin cleansed with alcohol pad and site anesthetized with lido + epi. Aluminum chloride applied to site for hemostasis. Petrolatum ointment and bandage applied. Specimen bottle labeled with patient information and site and specimen sent to [facility]path.

## 2022-06-29 LAB — DERMATOLOGY PATHOLOGY REPORT: NORMAL

## 2023-07-27 ENCOUNTER — OFFICE VISIT (OUTPATIENT)
Dept: DERMATOLOGY | Age: 38
End: 2023-07-27
Payer: COMMERCIAL

## 2023-07-27 DIAGNOSIS — L70.0 ACNE VULGARIS: ICD-10-CM

## 2023-07-27 DIAGNOSIS — L81.4 LENTIGINES: ICD-10-CM

## 2023-07-27 DIAGNOSIS — D22.9 MULTIPLE NEVI: Primary | ICD-10-CM

## 2023-07-27 DIAGNOSIS — L72.11 PILAR CYST: ICD-10-CM

## 2023-07-27 PROCEDURE — 99214 OFFICE O/P EST MOD 30 MIN: CPT | Performed by: DERMATOLOGY

## 2023-07-27 RX ORDER — LEVOTHYROXINE, LIOTHYRONINE 9.5; 2.25 UG/1; UG/1
15 TABLET ORAL EVERY MORNING
COMMUNITY
Start: 2023-07-15

## 2023-07-27 RX ORDER — LEVOTHYROXINE SODIUM 50 MCG
50 TABLET ORAL DAILY
COMMUNITY
Start: 2023-07-15

## 2023-07-27 RX ORDER — TRETINOIN 0.025 %
CREAM (GRAM) TOPICAL
Qty: 20 G | Refills: 2 | Status: SHIPPED | OUTPATIENT
Start: 2023-07-27

## 2023-07-27 NOTE — PROGRESS NOTES
[facility]  Marc Castano MD  [address] [facility]  1985    30 y.o. female     Date of Visit: 7/27/2023    Chief Complaint: moles, lesions  Chief Complaint   Patient presents with    Skin Exam     Last seen: 6-2022    History of Present Illness    Running family [address]. Has 2 children - almost 2, 4, 6    1. Here for eval of asx moles on the trunk and extremities, present for several years and no changes noted in s/s/c of any lesions; no bleeding lesions. She has a lesion on the forehead that she would like checked. Present for many years and unchanged - blue nevus. NO personal or family hx of skin cancer except for grandfather with NMSC. I had previously noted a dark nevus on the left lateral/posterior thigh at her visit in 2017-it appears that she had this biopsied since she was last seen by another Nate Davis does not remember it specifically being done but has a subtle scar here with focal central recurrence of pigment. It was done at the dermatology group in [facility] had seen Dr. Audrey Armas and her PA who had removed a lipoma on her left leg as well. *s/p bx of lentigo on the L cheek in 2022 -read as solar lentigo    2. F/u acne (back/torso) - not bad recently. Previously discussed treatment for acne scarring on the cheeks. Had laser in the past with good results and no complications. Would like to start retin a for maintenance. 3. She has an asx cystic papule on the frontal scalp. Might like removed at some point. Other hx: hx of \"eczema\" on the arms and legs (pruritic erythematous patches) - typically has been worse in the winter and clears in the summers with sun exposure. Has tried topical steroids with improvement but flares within a few days with stopping use. Had flared at last visit with bruising from scratching so hard and had trouble sleeping some nights.   Was treated with IM Kenalog and improved (had transient